# Patient Record
Sex: FEMALE | Race: WHITE | NOT HISPANIC OR LATINO | Employment: FULL TIME | URBAN - METROPOLITAN AREA
[De-identification: names, ages, dates, MRNs, and addresses within clinical notes are randomized per-mention and may not be internally consistent; named-entity substitution may affect disease eponyms.]

---

## 2020-03-09 ENCOUNTER — OFFICE VISIT (OUTPATIENT)
Dept: GASTROENTEROLOGY | Facility: CLINIC | Age: 60
End: 2020-03-09
Payer: COMMERCIAL

## 2020-03-09 VITALS
BODY MASS INDEX: 35.33 KG/M2 | DIASTOLIC BLOOD PRESSURE: 88 MMHG | WEIGHT: 192 LBS | HEART RATE: 70 BPM | HEIGHT: 62 IN | SYSTOLIC BLOOD PRESSURE: 124 MMHG

## 2020-03-09 DIAGNOSIS — K21.9 GASTROESOPHAGEAL REFLUX DISEASE, ESOPHAGITIS PRESENCE NOT SPECIFIED: ICD-10-CM

## 2020-03-09 DIAGNOSIS — Z86.010 PERSONAL HISTORY OF COLONIC POLYPS: ICD-10-CM

## 2020-03-09 DIAGNOSIS — R10.32 LLQ PAIN: Primary | ICD-10-CM

## 2020-03-09 DIAGNOSIS — K62.5 RECTAL BLEEDING: ICD-10-CM

## 2020-03-09 DIAGNOSIS — R19.8 RECTAL PRESSURE: ICD-10-CM

## 2020-03-09 DIAGNOSIS — R14.0 ABDOMINAL BLOATING: ICD-10-CM

## 2020-03-09 DIAGNOSIS — R14.3 EXCESSIVE FLATUS: ICD-10-CM

## 2020-03-09 DIAGNOSIS — Z83.71 FAMILY HISTORY OF COLONIC POLYPS: ICD-10-CM

## 2020-03-09 PROBLEM — Z86.0100 PERSONAL HISTORY OF COLONIC POLYPS: Status: ACTIVE | Noted: 2020-03-09

## 2020-03-09 PROBLEM — Z83.719 FAMILY HISTORY OF COLONIC POLYPS: Status: ACTIVE | Noted: 2020-03-09

## 2020-03-09 PROCEDURE — 99204 OFFICE O/P NEW MOD 45 MIN: CPT | Performed by: NURSE PRACTITIONER

## 2020-03-09 RX ORDER — TOPIRAMATE 25 MG/1
25-50 TABLET ORAL
COMMUNITY
Start: 2019-12-09

## 2020-03-09 RX ORDER — ESZOPICLONE 3 MG/1
3 TABLET, FILM COATED ORAL
COMMUNITY

## 2020-03-09 RX ORDER — ASCORBIC ACID 500 MG
500 TABLET ORAL DAILY
COMMUNITY

## 2020-03-09 RX ORDER — MELATONIN
1000 DAILY
COMMUNITY

## 2020-03-09 RX ORDER — LEVOTHYROXINE SODIUM 137 UG/1
137 CAPSULE ORAL DAILY
COMMUNITY
Start: 2020-02-24

## 2020-03-09 RX ORDER — MULTIVITAMIN
1 TABLET ORAL DAILY
COMMUNITY

## 2020-03-09 RX ORDER — OMEPRAZOLE 40 MG/1
40 CAPSULE, DELAYED RELEASE ORAL DAILY
COMMUNITY
Start: 2020-02-12

## 2020-03-09 RX ORDER — DICYCLOMINE HYDROCHLORIDE 10 MG/1
10 CAPSULE ORAL
Qty: 120 CAPSULE | Refills: 2 | Status: SHIPPED | OUTPATIENT
Start: 2020-03-09 | End: 2020-04-01

## 2020-03-09 NOTE — PATIENT INSTRUCTIONS
You can use the dicyclomine up to 4 times a day for the abdominal  Pain   get the blood work and the CT scan done    Once I have those results, we will most likely schedule you for colonoscopy

## 2020-03-09 NOTE — PROGRESS NOTES
[id number] - Outpatient Consultation  Monica Vale 64 y o  female MRN: [id number]  Encounter: [id number]    ASSESSMENT AND PLAN:      1  LLQ pain  2  Excessive flatus  3  Abdominal bloating  Reports symptoms are reminiscent of her 1st episode of diverticulitis 2 years ago however they have persisted for 1 month making acute diverticulitis less likely however she does have significant tenderness in the LLQ on exam   Additional differential considerations include infectious or ischemic colitis, colonic polyps or neoplasm, less likely IBD  -will check labs including CRP and ESR  -CT scan abdomen and pelvis and will treat if acute diverticulitis or infectious-type colitis is noted  -will try dicyclomine for the abdominal pain and may take up to 4 times a day  -pending CT results will schedule for colonoscopy as she is due on recall this year  - CBC; Future  - Comprehensive metabolic panel; Future  - CT abdomen pelvis w contrast; Future  - C-reactive protein; Future  - Sedimentation rate, automated; Future  - dicyclomine (BENTYL) 10 mg capsule; Take 1 capsule (10 mg total) by mouth 4 (four) times a day (before meals and at bedtime)  Dispense: 120 capsule; Refill: 2    4  Rectal bleeding  5  Rectal pressure  One-month of rectal pressure and 1 week of rectal bleeding with all bowel movements  Differential considerations include hemorrhoidal or diverticular but concern for colonic polyps or neoplasm as well     -will obtain some lab work and CT scan  -pending CT results, will schedule for colonoscopy  - CBC; Future  - Comprehensive metabolic panel; Future  - C-reactive protein; Future  - Sedimentation rate, automated; Future  - CT abdomen pelvis w contrast; Future    6  Gastroesophageal reflux disease, esophagitis presence not specified  Symptoms well controlled with omeprazole 40 mg daily    Last EGD 11/20/2017showed nonerosive gastritis and irregularity at, just proximal to the squamocolumnar junction  Pathology was negative for H pylori, Morales's  7  Personal history of colonic polyps  8  Family history of colonic polyps  High risk with PH of polyps, mother and 2 sisters with polyps  Last colonoscopy 12/04/2015 showed grade 2 internal hemorrhoids and 1 hyperplastic polyp  Five year recall-2020  Followup Appointment:  4 weeks  ______________________________________________________________________    Chief Complaint   Patient presents with    LLQ abdominal pain    Gas    Rectal Bleeding       HPI:   Bertin Gasca is a 64 y o   female who presents complaining of LLQ pain with additional GI symptoms that have persisted for 1 month  Reports her 1st event of diverticulitis was 2 years ago for which she was hospitalized and treated with antibiotics  Says surgery was recommended but she declined at that time  Since that event, she has had continuous LLQ pain that is a dull ache in unrelated to dietary indiscretions, meals  About 1 month ago, the pain intensified with additional complaint of abdominal bloating and excessive flatus with rectal pressure  Symptoms are unrelated to food triggers or meals  Continues with 1 soft bowel movement daily and will take a stool softener or eat oatmeal if she skips a day  The caliber of her bowel movements has decreased over the past month, now described as [place]    She says she feels like she has more frequent urge to defecate with a feeling of a lump in her rectal area  For the past 1 week, there has also been BRBPR with all bowel movements noted on the toilet paper and in the toilet water  Denies diarrhea or constipation, straining  Reports some fatigue but denies anorexia, early satiety, UGI or alarm symptoms  No fevers or chills  Appetite is normal   Weight is stable      Historical Information   Past Medical History:   Diagnosis Date    Anxiety     Colon polyp     Diverticulitis of colon     Diverticulosis     GERD (gastroesophageal reflux disease)     Hashimoto's disease     Hypothyroidism     Thyroid nodule     Vitamin D deficiency      Past Surgical History:   Procedure Laterality Date    ABDOMINOPLASTY       SECTION      X2    COLONOSCOPY      ELBOW SURGERY Left     Tendon repair    HYSTERECTOMY      With lysis of adhesions and abdominoplasty    KNEE CARTILAGE SURGERY Right     OOPHORECTOMY      One ovary removed with hysterectomy    SALPINGOSTOMY      Tubes fixed for fertility issues    UPPER GASTROINTESTINAL ENDOSCOPY       Social History     Substance and Sexual Activity   Alcohol Use Yes    Frequency: Monthly or less    Drinks per session: 3 or 4    Binge frequency: Never     Social History     Substance and Sexual Activity   Drug Use Never     Social History     Tobacco Use   Smoking Status Never Smoker   Smokeless Tobacco Never Used     Family History   Problem Relation Age of Onset    Colon polyps Mother     Diabetes Father     Colon polyps Sister     Colon cancer Paternal Uncle     Colon polyps Sister     No Known Problems Sister     No Known Problems Sister        Meds/Allergies     Current Outpatient Medications:     ascorbic acid (VITAMIN C) 500 mg tablet    cholecalciferol (VITAMIN D3) 1,000 units tablet    eszopiclone (LUNESTA) 3 MG tablet    levothyroxine 112 mcg tablet    Multiple Vitamin (MULTIVITAMIN) tablet    omeprazole (PriLOSEC) 40 MG capsule    topiramate (TOPAMAX) 25 mg tablet    dicyclomine (BENTYL) 10 mg capsule    No Known Allergies    PHYSICAL EXAM:    Blood pressure 124/88, pulse 70, height 5' 2" (1 575 m), weight 87 1 kg (192 lb)  Body mass index is 35 12 kg/m²  General Appearance: NAD, cooperative, alert  Head:  Normocephalic, atraumatic  Eyes: Anicteric, PERRLA, EOMI  ENT:  Normal external appearance, normal mucosa       Neck:  Supple, symmetrical, trachea midline,   Resp:  Clear to auscultation bilaterally; no rales, rhonchi or wheezing; respirations unlabored   CV:  S1 S2, Regular rate and rhythm; no murmur, rub, or gallop  GI:  Soft, LLQ tenderness with palpation but without rebound or guarding and otherwise abdomen was nontender, non-distended; normal bowel sounds; no masses, no organomegaly   Rectal:  Declines at this time  Musculoskeletal: No cyanosis, clubbing or edema  Normal ROM  Skin:  No jaundice, rashes, or lesions   Heme/Lymph: No palpable cervical lymphadenopathy  Psych: Normal affect, good eye contact  Neuro: No gross deficits, AAOx3    Lab Results:   No results found for: WBC, HGB, HCT, MCV, PLT  Lab Results   Component Value Date    BUN 11 09/25/2014    CREATININE 0 83 09/25/2014     No results found for: IRON, TIBC, FERRITIN  No results found for: LIPASE    Radiology Results:   No results found  REVIEW OF SYSTEMS:    CONSTITUTIONAL: Denies any fever, chills, rigors, and weight loss  Reports fatigue  HEENT: No earache or tinnitus  Denies hearing loss or visual disturbances  CARDIOVASCULAR: No chest pain or palpitations  RESPIRATORY: Denies any cough, hemoptysis, shortness of breath or dyspnea on exertion  GASTROINTESTINAL: As noted in the History of Present Illness  Reports blood in stool and change in bowel habits  GENITOURINARY: No problems with urination  Denies any hematuria or dysuria  NEUROLOGIC: No dizziness or vertigo, denies headaches  MUSCULOSKELETAL: Denies any muscle or joint pain  Hematology:  Reports easy bruising  SKIN: Denies skin rashes or itching  ENDOCRINE: Denies excessive thirst  Denies intolerance to heat or cold  PSYCHOSOCIAL: Denies depression or anxiety but reports difficulty sleeping  Denies any recent memory loss

## 2020-03-10 ENCOUNTER — TELEPHONE (OUTPATIENT)
Dept: GASTROENTEROLOGY | Facility: CLINIC | Age: 60
End: 2020-03-10

## 2020-03-10 NOTE — TELEPHONE ENCOUNTER
Pt states she needs CT A/P; called the hospital and they do not have the order  Order in [address] [employer] system  Pt states she will go to [facility]; asks for order to be fax'd to [phone]  Pt also has ques about lab order/has appt tomorrow ramiro Whiting'd lab order in system  Successfully fax'd CT order to [address] fax # [id number]

## 2020-03-12 LAB
CRP SERPL-MCNC: 4 MG/L (ref 0–10)
ERYTHROCYTE [SEDIMENTATION RATE] IN BLOOD BY WESTERGREN METHOD: 30 MM/HR (ref 0–40)

## 2020-03-17 ENCOUNTER — TELEPHONE (OUTPATIENT)
Dept: GASTROENTEROLOGY | Facility: CLINIC | Age: 60
End: 2020-03-17

## 2020-03-17 DIAGNOSIS — K57.92 DIVERTICULITIS: Primary | ICD-10-CM

## 2020-03-17 RX ORDER — METRONIDAZOLE 500 MG/1
500 TABLET ORAL 3 TIMES DAILY
Qty: 30 TABLET | Refills: 0 | Status: SHIPPED | OUTPATIENT
Start: 2020-03-17 | End: 2020-03-20

## 2020-03-17 RX ORDER — CIPROFLOXACIN 500 MG/1
500 TABLET, FILM COATED ORAL 2 TIMES DAILY
Qty: 20 TABLET | Refills: 0 | Status: SHIPPED | OUTPATIENT
Start: 2020-03-17 | End: 2020-03-20

## 2020-03-17 NOTE — TELEPHONE ENCOUNTER
Called and reviewed the CT scan results directly with the patient by telephone  She still having L LQ pain and the CT scan shows possible early sigmoid diverticulitis  I advised her that I will treat this with Cipro and metronidazole for 10 days  I did E scribed both of these to her pharmacy  I advised that she take these with food but keep the diet light, more clear liquids until the pain resolves  She should then increase her diet to low fiber      Thanks HPR

## 2020-03-18 ENCOUNTER — TELEPHONE (OUTPATIENT)
Dept: GASTROENTEROLOGY | Facility: CLINIC | Age: 60
End: 2020-03-18

## 2020-03-18 NOTE — TELEPHONE ENCOUNTER
Sent a message to [employer] requesting they delete the second page of her ct scan as is another pt report and replace it with her second page

## 2020-03-20 ENCOUNTER — TELEPHONE (OUTPATIENT)
Dept: GASTROENTEROLOGY | Facility: CLINIC | Age: 60
End: 2020-03-20

## 2020-03-20 DIAGNOSIS — K57.92 DIVERTICULITIS: Primary | ICD-10-CM

## 2020-03-20 RX ORDER — AMOXICILLIN AND CLAVULANATE POTASSIUM 875; 125 MG/1; MG/1
1 TABLET, FILM COATED ORAL EVERY 12 HOURS SCHEDULED
Qty: 20 TABLET | Refills: 0 | Status: SHIPPED | OUTPATIENT
Start: 2020-03-20 | End: 2020-03-30

## 2020-03-20 NOTE — TELEPHONE ENCOUNTER
Called and spoke with patient  She is still complaining of significant left lower abdominal pain  Denies any fevers, nausea, vomiting  She has been taking Cipro and Flagyl for the past 2 days with no improvement in her symptoms  She was treated for diverticulitis 2 years ago and had two medications which did not work for her (pt does not remember the names of the medications)  She has been trying to stick to a clear liquid diet, but had eggs this morning which may have worsened her pain  Discussed with Dr Tellez Res  Will switch to Augmentin 875 mg BID for 10 days, will stop Cipro and Flagyl  If she does not have any improvement of her symptoms in 24 hours, recommend that pt go to the ED for IV abx  Continue clear liquid diet and gradually advance to low fiber as tolerated  Pt verbalized understanding and all questions answered

## 2020-03-20 NOTE — TELEPHONE ENCOUNTER
Patient was seen by [facility] 3/9/2020  Ct A/P performed  Notes recorded by Jacob Costa on 3/18/2020 at 1:52 PM EDT  You have mild diverticulitis which we are already treating with an antibiotic per our discussion by phone yesterday  Per message below patient does not feel the antibiotics are working  I tried to find her previous therapy in eclinical; she was hospitalized at [facility] treated with IV then oral antibiotics  In 2/2018 she was prescribed Levaquin 750 mg once daily x 9 days along with metronidazole 500 mg every 8 hours x 9 days  Patient without LLQ pain improvement; she started course of oral antibiotics 3/18/2020  Please provide further recommendations   She denies fever, advised to take tylenol for pain, adhere to clear liquid diet for bowel rest

## 2020-03-20 NOTE — TELEPHONE ENCOUNTER
Pt was put on Flagyl and Cipro for a colon infection on Tuesday  Pt is getting no relief at this point  She is still having pain and diarrhea  She did have some eggs this am but is trying adhere to a clear liquid diet

## 2020-03-23 ENCOUNTER — TELEPHONE (OUTPATIENT)
Dept: GASTROENTEROLOGY | Facility: CLINIC | Age: 60
End: 2020-03-23

## 2020-03-23 NOTE — TELEPHONE ENCOUNTER
Called and spoke with patient  She states that her pain is about the same and rates pain 6/10  She did have some scrambled eggs this morning and states that the pain may be was a little bit worse  She has pain in left upper quadrant as well  She denies any fever, nausea, vomiting  She does have some looser stool since starting antibiotics but denies any rectal bleeding or melena  She is staying well hydrated  She started Augmentin on Friday evening  I recommended that she continue to monitor her symptoms closely for the next 24 to 48 hours  If her pain remains unchanged and she is not feeling any better, I would recommend ER evaluation at that time for IV antibiotics  Continue clear liquid diet as much as she is able to and slowly advance to low fiber diet  Patient verbalized understanding and all questions answered

## 2020-03-23 NOTE — TELEPHONE ENCOUNTER
I called the patient  Symptoms a little better but still only clear liquids and she has to continue working ( got laid off) and she really does not want to go to hospital   Offered virtual visit and/or OV but is concerned about insurance coverage  Could you please touch base with the patient today?

## 2020-03-23 NOTE — TELEPHONE ENCOUNTER
----- Message from Greer Palmer sent at 3/22/2020 10:35 AM EDT -----  Regarding: Visit Follow-Up Question  Contact: [phone]  Hi Dr Iveth Nogueira, the nurse changed my antibiotic on Friday but I am still feeling pain on my left side  I do not have a fever and have been on clear liquids since Tuesday night  I am feeling pretty weak and trying to manage my job with not eating  It is not as bad as when I went in the hospital 2 years ago but I am scared to have this going on with the Virus going around and don't want my pain to escalate  Please advise

## 2020-04-01 DIAGNOSIS — R10.32 LLQ PAIN: ICD-10-CM

## 2020-04-01 RX ORDER — DICYCLOMINE HYDROCHLORIDE 10 MG/1
10 CAPSULE ORAL
Qty: 360 CAPSULE | Refills: 2 | Status: SHIPPED | OUTPATIENT
Start: 2020-04-01

## 2020-04-09 ENCOUNTER — TELEPHONE (OUTPATIENT)
Dept: GASTROENTEROLOGY | Facility: CLINIC | Age: 60
End: 2020-04-09

## 2020-04-13 ENCOUNTER — TELEMEDICINE (OUTPATIENT)
Dept: GASTROENTEROLOGY | Facility: CLINIC | Age: 60
End: 2020-04-13
Payer: COMMERCIAL

## 2020-04-13 VITALS — HEIGHT: 62 IN | BODY MASS INDEX: 34.41 KG/M2 | WEIGHT: 187 LBS

## 2020-04-13 DIAGNOSIS — K62.5 RECTAL BLEEDING: ICD-10-CM

## 2020-04-13 DIAGNOSIS — Z83.71 FAMILY HISTORY OF COLONIC POLYPS: ICD-10-CM

## 2020-04-13 DIAGNOSIS — Z86.010 PERSONAL HISTORY OF COLONIC POLYPS: ICD-10-CM

## 2020-04-13 DIAGNOSIS — R19.7 DIARRHEA OF PRESUMED INFECTIOUS ORIGIN: Primary | ICD-10-CM

## 2020-04-13 DIAGNOSIS — R10.32 LLQ PAIN: ICD-10-CM

## 2020-04-13 DIAGNOSIS — Z12.11 ENCOUNTER FOR SCREENING COLONOSCOPY: ICD-10-CM

## 2020-04-13 PROCEDURE — 99214 OFFICE O/P EST MOD 30 MIN: CPT | Performed by: INTERNAL MEDICINE

## 2020-04-13 RX ORDER — VANCOMYCIN HYDROCHLORIDE 250 MG/1
250 CAPSULE ORAL 4 TIMES DAILY
Qty: 56 CAPSULE | Refills: 0 | Status: SHIPPED | OUTPATIENT
Start: 2020-04-13 | End: 2020-04-27

## 2020-04-15 ENCOUNTER — TELEPHONE (OUTPATIENT)
Dept: GASTROENTEROLOGY | Facility: CLINIC | Age: 60
End: 2020-04-15

## 2020-04-16 ENCOUNTER — TELEPHONE (OUTPATIENT)
Dept: GASTROENTEROLOGY | Facility: CLINIC | Age: 60
End: 2020-04-16

## 2020-04-17 ENCOUNTER — TELEPHONE (OUTPATIENT)
Dept: GASTROENTEROLOGY | Facility: CLINIC | Age: 60
End: 2020-04-17

## 2020-06-22 NOTE — TELEPHONE ENCOUNTER
The correct ct scan first and second page is in pt's chart under ordered 4/13/20 but the ordered 3/9/20 still needs to be deleted  Butch Mello, could you please assist me with this deletion

## 2020-06-23 NOTE — TELEPHONE ENCOUNTER
Submitted a third chart correction request and again requested update once correction is made  We'll keep an eye out for it, if not resolved will contact Chritsiano Razo

## 2020-10-16 ENCOUNTER — TELEPHONE (OUTPATIENT)
Dept: GASTROENTEROLOGY | Facility: CLINIC | Age: 60
End: 2020-10-16

## 2020-11-23 ENCOUNTER — TELEMEDICINE (OUTPATIENT)
Dept: GASTROENTEROLOGY | Facility: CLINIC | Age: 60
End: 2020-11-23

## 2020-11-23 VITALS — BODY MASS INDEX: 34.96 KG/M2 | HEIGHT: 62 IN | WEIGHT: 190 LBS

## 2020-11-23 DIAGNOSIS — Z83.71 FAMILY HISTORY OF COLONIC POLYPS: ICD-10-CM

## 2020-11-23 DIAGNOSIS — Z86.010 PERSONAL HISTORY OF COLONIC POLYPS: Primary | ICD-10-CM

## 2020-11-23 RX ORDER — CYCLOBENZAPRINE HCL 10 MG
10 TABLET ORAL 3 TIMES DAILY PRN
COMMUNITY

## 2020-11-23 RX ORDER — SODIUM, POTASSIUM,MAG SULFATES 17.5-3.13G
SOLUTION, RECONSTITUTED, ORAL ORAL
Qty: 2 BOTTLE | Refills: 0 | Status: SHIPPED | OUTPATIENT
Start: 2020-11-23 | End: 2020-11-30 | Stop reason: HOSPADM

## 2020-11-30 ENCOUNTER — ANESTHESIA EVENT (OUTPATIENT)
Dept: GASTROENTEROLOGY | Facility: AMBULATORY SURGERY CENTER | Age: 60
End: 2020-11-30

## 2020-11-30 ENCOUNTER — ANESTHESIA (OUTPATIENT)
Dept: GASTROENTEROLOGY | Facility: AMBULATORY SURGERY CENTER | Age: 60
End: 2020-11-30

## 2020-11-30 ENCOUNTER — HOSPITAL ENCOUNTER (OUTPATIENT)
Dept: GASTROENTEROLOGY | Facility: AMBULATORY SURGERY CENTER | Age: 60
Discharge: HOME | End: 2020-11-30
Payer: COMMERCIAL

## 2020-11-30 VITALS
DIASTOLIC BLOOD PRESSURE: 68 MMHG | HEART RATE: 72 BPM | TEMPERATURE: 98.5 F | SYSTOLIC BLOOD PRESSURE: 105 MMHG | OXYGEN SATURATION: 97 % | RESPIRATION RATE: 18 BRPM

## 2020-11-30 VITALS — HEART RATE: 90 BPM

## 2020-11-30 DIAGNOSIS — Z86.010 PERSONAL HISTORY OF COLONIC POLYPS: ICD-10-CM

## 2020-11-30 DIAGNOSIS — K22.70 BARRETT'S ESOPHAGUS WITHOUT DYSPLASIA: ICD-10-CM

## 2020-11-30 DIAGNOSIS — Z12.11 SCREENING FOR COLON CANCER: ICD-10-CM

## 2020-11-30 PROCEDURE — 88313 SPECIAL STAINS GROUP 2: CPT | Performed by: PATHOLOGY

## 2020-11-30 PROCEDURE — 43239 EGD BIOPSY SINGLE/MULTIPLE: CPT | Performed by: INTERNAL MEDICINE

## 2020-11-30 PROCEDURE — 88305 TISSUE EXAM BY PATHOLOGIST: CPT | Performed by: PATHOLOGY

## 2020-11-30 PROCEDURE — G0105 COLORECTAL SCRN; HI RISK IND: HCPCS | Performed by: INTERNAL MEDICINE

## 2020-11-30 RX ORDER — PROPOFOL 10 MG/ML
INJECTION, EMULSION INTRAVENOUS AS NEEDED
Status: DISCONTINUED | OUTPATIENT
Start: 2020-11-30 | End: 2020-11-30

## 2020-11-30 RX ORDER — SODIUM CHLORIDE 9 MG/ML
50 INJECTION, SOLUTION INTRAVENOUS CONTINUOUS
Status: DISCONTINUED | OUTPATIENT
Start: 2020-11-30 | End: 2020-11-30

## 2020-11-30 RX ADMIN — SODIUM CHLORIDE 50 ML/HR: 9 INJECTION, SOLUTION INTRAVENOUS at 08:17

## 2020-11-30 RX ADMIN — PROPOFOL 100 MG: 10 INJECTION, EMULSION INTRAVENOUS at 08:38

## 2020-11-30 RX ADMIN — PROPOFOL 50 MG: 10 INJECTION, EMULSION INTRAVENOUS at 08:31

## 2020-11-30 RX ADMIN — PROPOFOL 150 MG: 10 INJECTION, EMULSION INTRAVENOUS at 08:28

## 2020-11-30 RX ADMIN — PROPOFOL 50 MG: 10 INJECTION, EMULSION INTRAVENOUS at 08:44

## 2021-04-06 DIAGNOSIS — Z23 ENCOUNTER FOR IMMUNIZATION: ICD-10-CM

## 2021-04-15 ENCOUNTER — IMMUNIZATIONS (OUTPATIENT)
Dept: FAMILY MEDICINE CLINIC | Facility: HOSPITAL | Age: 61
End: 2021-04-15

## 2021-04-15 DIAGNOSIS — Z23 ENCOUNTER FOR IMMUNIZATION: Primary | ICD-10-CM

## 2021-04-15 PROCEDURE — 91300 SARS-COV-2 / COVID-19 MRNA VACCINE (PFIZER-BIONTECH) 30 MCG: CPT

## 2021-04-15 PROCEDURE — 0001A SARS-COV-2 / COVID-19 MRNA VACCINE (PFIZER-BIONTECH) 30 MCG: CPT

## 2021-05-10 ENCOUNTER — IMMUNIZATIONS (OUTPATIENT)
Dept: FAMILY MEDICINE CLINIC | Facility: HOSPITAL | Age: 61
End: 2021-05-10

## 2021-05-10 DIAGNOSIS — Z23 ENCOUNTER FOR IMMUNIZATION: Primary | ICD-10-CM

## 2021-05-10 PROCEDURE — 91300 SARS-COV-2 / COVID-19 MRNA VACCINE (PFIZER-BIONTECH) 30 MCG: CPT

## 2021-05-10 PROCEDURE — 0002A SARS-COV-2 / COVID-19 MRNA VACCINE (PFIZER-BIONTECH) 30 MCG: CPT

## 2023-01-30 LAB — HBA1C MFR BLD HPLC: 5.4 %

## 2023-07-10 ENCOUNTER — TELEPHONE (OUTPATIENT)
Age: 63
End: 2023-07-10

## 2023-07-10 NOTE — TELEPHONE ENCOUNTER
Pt call transferred to nurses line. Pt explained she was in ED for diverticulitis and that the pain is gone now. However pt experiencing loss of appetite, diarrhea, and lethargy. I provided education on symptoms that would prompt ED evaluation. I explained to pt given she is a new pt, I am unable to further advise and an OV would be best. Pt is scheduled for 7/21.

## 2023-07-21 ENCOUNTER — OFFICE VISIT (OUTPATIENT)
Age: 63
End: 2023-07-21
Payer: COMMERCIAL

## 2023-07-21 VITALS
DIASTOLIC BLOOD PRESSURE: 80 MMHG | WEIGHT: 152.2 LBS | HEIGHT: 62 IN | SYSTOLIC BLOOD PRESSURE: 110 MMHG | BODY MASS INDEX: 28.01 KG/M2

## 2023-07-21 DIAGNOSIS — K57.92 ACUTE DIVERTICULITIS: Primary | ICD-10-CM

## 2023-07-21 DIAGNOSIS — K21.9 GASTROESOPHAGEAL REFLUX DISEASE WITHOUT ESOPHAGITIS: ICD-10-CM

## 2023-07-21 DIAGNOSIS — K22.70 BARRETT'S ESOPHAGUS WITHOUT DYSPLASIA: ICD-10-CM

## 2023-07-21 PROCEDURE — 99213 OFFICE O/P EST LOW 20 MIN: CPT | Performed by: INTERNAL MEDICINE

## 2023-07-21 NOTE — PROGRESS NOTES
[facility] - Outpatient Follow-up Note  Lindy Child 63 y.o. female MRN: [id number]  Encounter: [id number]    ASSESSMENT AND PLAN:      1. Acute diverticulitis  Admitted to [address] [place] [address] in late June with acute uncomplicated diverticulitis of the distal descending colon after failing outpatient Augmentin. this was her fourth bout over the last 5 years    She was discharged on Levaquin/Flagyl and symptoms improved. Has a consult with Dr. Kaleigh Diana of colorectal surgery next week to discuss elective sigmoid resection. Her last colonoscopy was November 2020 and showed sigmoid diverticulosis, will confer with Dr. Kaleigh Diana whether repeat colonoscopy is necessary preop. 2. Gastroesophageal reflux disease without esophagitis  3. Morales's esophagus without dysplasia  Morales's without dysplasia most recent EGD November 2020, due for recall in November. Continue antireflux diet and omeprazole    4. Personal history of colon polyps  No polyps on most recent colonoscopy November 2020, 5-year recall planned    Followup Appointment: EGD November  ______________________________________________________________________    Chief Complaint   Patient presents with   • Hospital Follow-up     Pt was at [facility] for diverticulitis June 2023. Pt has diarrhea, PCP recommended Imodium, seems to be working. Also using Metamucil 2x daily. Pt still has some abdominal pain. HPI: The patient presents for follow-up on acute diverticulitis. She was admitted to [address] [place] [address] in late June. She had failed outpatient Augmentin. Symptoms gradually improved with IV antibiotics and she was discharged on Levaquin/Flagyl. She had some moderate discomfort for a few weeks after discharge but this has now resolved and she is back to baseline. She used IBgard transiently for abdominal bloating. This induced some reflux complaints.   She has an appointment with colorectal surgery next week to discuss elective sigmoidectomy    Historical Information   Past Medical History:   Diagnosis Date   • Anxiety    • Colon polyp    • Disease of thyroid gland     hypo   • Diverticulitis of colon    • Diverticulosis    • GERD (gastroesophageal reflux disease)    • Hashimoto's disease    • Hypothyroidism    • Thyroid nodule    • Vitamin D deficiency      Past Surgical History:   Procedure Laterality Date   • ABDOMINOPLASTY     •  SECTION      X2   • COLONOSCOPY  2015   • ELBOW SURGERY Left     Tendon repair   • HYSTERECTOMY      With lysis of adhesions and abdominoplasty   • KNEE CARTILAGE SURGERY Right    • OOPHORECTOMY      One ovary removed with hysterectomy   • SALPINGOSTOMY      Tubes fixed for fertility issues   • UPPER GASTROINTESTINAL ENDOSCOPY  2017     Social History     Substance and Sexual Activity   Alcohol Use Yes     Social History     Substance and Sexual Activity   Drug Use Never     Social History     Tobacco Use   Smoking Status Never   Smokeless Tobacco Never     Family History   Problem Relation Age of Onset   • Colon polyps Mother    • Diabetes Father    • Colon polyps Sister    • Colon cancer Paternal Uncle    • Colon polyps Sister    • No Known Problems Sister    • No Known Problems Sister          Current Outpatient Medications:   •  ascorbic acid (VITAMIN C) 500 mg tablet  •  cyclobenzaprine (FLEXERIL) 10 mg tablet  •  eszopiclone (LUNESTA) 3 MG tablet  •  Levothyroxine Sodium 137 MCG CAPS  •  Multiple Vitamin (MULTIVITAMIN) tablet  •  omeprazole (PriLOSEC) 40 MG capsule  •  cholecalciferol (VITAMIN D3) 1,000 units tablet  •  dicyclomine (BENTYL) 10 mg capsule  •  topiramate (TOPAMAX) 25 mg tablet  Allergies   Allergen Reactions   • Latex Hives, Itching and Rash     Reviewed medications and allergies and updated as indicated    PHYSICAL EXAM:    Blood pressure 110/80, height 5' 2.25" (1.581 m), weight 69 kg (152 lb 3.2 oz). Body mass index is 27.61 kg/m².   General Appearance: NAD, cooperative, alert  Eyes: Anicteric, PERRLA, EOMI  ENT:  Normocephalic, atraumatic, normal mucosa. Neck:  Supple, symmetrical, trachea midline  Resp:  Clear to auscultation bilaterally; no rales, rhonchi or wheezing; respirations unlabored   CV:  S1 S2, Regular rate and rhythm; no murmur, rub, or gallop. GI:  Soft, non-tender, non-distended; normal bowel sounds; no masses, no organomegaly   Rectal: Deferred  Musculoskeletal: No cyanosis, clubbing or edema. Normal ROM. Skin:  No jaundice, rashes, or lesions   Heme/Lymph: No palpable cervical lymphadenopathy  Psych: Normal affect, good eye contact  Neuro: No gross deficits, AAOx3    Lab Results:   No results found for: "WBC", "HGB", "HCT", "MCV", "PLT"  Lab Results   Component Value Date    BUN 11 09/25/2014    CREATININE 0.83 09/25/2014     No results found for: "IRON", "TIBC", "FERRITIN"  No results found for: "LIPASE"    Radiology Results:   No results found.

## 2023-07-28 ENCOUNTER — NURSE TRIAGE (OUTPATIENT)
Age: 63
End: 2023-07-28

## 2023-07-28 NOTE — TELEPHONE ENCOUNTER
Regarding: surg request  ----- Message from Carlos Enrique Peralta MA sent at 7/28/2023 10:08 AM EDT -----  Pt called stating that dr. Lam Mendiola wants her to have a colonoscopy prior to pt having surg. Please advise.  Pt not due until 2025

## 2023-07-31 NOTE — TELEPHONE ENCOUNTER
Per Dr Catarina Lawler pt asap for colon [facility] endo with him  Dr Tani Cannon has opening 8/14 or 8/17  NEEDS ASD DONE  HAD OV SO OA DOES NOT NEED TO BE DONE

## 2023-08-11 ENCOUNTER — TELEPHONE (OUTPATIENT)
Dept: GASTROENTEROLOGY | Facility: CLINIC | Age: 63
End: 2023-08-11

## 2023-08-11 NOTE — TELEPHONE ENCOUNTER
Procedure confirmed  Colonoscopy     Via: [employer]    Instructions given: Mail     Prep Given: Miralax/Dulcolax    Call the office if there are any questions.

## 2023-08-24 ENCOUNTER — ANESTHESIA EVENT (OUTPATIENT)
Dept: GASTROENTEROLOGY | Facility: AMBULATORY SURGERY CENTER | Age: 63
End: 2023-08-24

## 2023-08-24 ENCOUNTER — ANESTHESIA (OUTPATIENT)
Dept: GASTROENTEROLOGY | Facility: AMBULATORY SURGERY CENTER | Age: 63
End: 2023-08-24

## 2023-08-24 ENCOUNTER — HOSPITAL ENCOUNTER (OUTPATIENT)
Dept: GASTROENTEROLOGY | Facility: AMBULATORY SURGERY CENTER | Age: 63
Discharge: HOME | End: 2023-08-24
Payer: COMMERCIAL

## 2023-08-24 VITALS
SYSTOLIC BLOOD PRESSURE: 104 MMHG | BODY MASS INDEX: 27.23 KG/M2 | WEIGHT: 148 LBS | DIASTOLIC BLOOD PRESSURE: 56 MMHG | TEMPERATURE: 97.9 F | OXYGEN SATURATION: 99 % | RESPIRATION RATE: 18 BRPM | HEIGHT: 62 IN | HEART RATE: 71 BPM

## 2023-08-24 DIAGNOSIS — K57.92 ACUTE DIVERTICULITIS: ICD-10-CM

## 2023-08-24 PROCEDURE — 45378 DIAGNOSTIC COLONOSCOPY: CPT | Performed by: INTERNAL MEDICINE

## 2023-08-24 RX ORDER — SODIUM CHLORIDE, SODIUM LACTATE, POTASSIUM CHLORIDE, CALCIUM CHLORIDE 600; 310; 30; 20 MG/100ML; MG/100ML; MG/100ML; MG/100ML
50 INJECTION, SOLUTION INTRAVENOUS CONTINUOUS
Status: DISCONTINUED | OUTPATIENT
Start: 2023-08-24 | End: 2023-08-24

## 2023-08-24 RX ORDER — PROPOFOL 10 MG/ML
INJECTION, EMULSION INTRAVENOUS AS NEEDED
Status: DISCONTINUED | OUTPATIENT
Start: 2023-08-24 | End: 2023-08-24

## 2023-08-24 RX ADMIN — PROPOFOL 150 MG: 10 INJECTION, EMULSION INTRAVENOUS at 09:48

## 2023-08-24 RX ADMIN — PROPOFOL 50 MG: 10 INJECTION, EMULSION INTRAVENOUS at 10:02

## 2023-08-24 RX ADMIN — PROPOFOL 50 MG: 10 INJECTION, EMULSION INTRAVENOUS at 09:52

## 2023-08-24 RX ADMIN — SODIUM CHLORIDE, SODIUM LACTATE, POTASSIUM CHLORIDE, CALCIUM CHLORIDE 50 ML/HR: 600; 310; 30; 20 INJECTION, SOLUTION INTRAVENOUS at 09:27

## 2023-08-24 RX ADMIN — PROPOFOL 50 MG: 10 INJECTION, EMULSION INTRAVENOUS at 09:58

## 2023-08-24 NOTE — ANESTHESIA PREPROCEDURE EVALUATION
Procedure:  COLONOSCOPY    Relevant Problems   GI/HEPATIC   (+) Gastroesophageal reflux disease   (+) Rectal bleeding      NEURO/PSYCH   (+) Anxiety      Endocrine   (+) Hashimoto's disease        Physical Exam    Airway    Mallampati score: II  TM Distance: <3 FB  Neck ROM: full     Dental   Comment: None loose, No notable dental hx     Cardiovascular      Pulmonary      Other Findings        Anesthesia Plan  ASA Score- 2     Anesthesia Type- IV sedation with anesthesia with ASA Monitors. Additional Monitors:   Airway Plan:     Comment: 05:30 - last of PO bowel prep    Patient educated on the possibility for awareness under sedation and of the possibility of airway intervention in the event of an airway or procedural emergency  . Plan Factors-Exercise tolerance (METS): >4 METS. Chart reviewed. Patient summary reviewed. Patient is not a current smoker. Induction- intravenous. Postoperative Plan-     Informed Consent- Anesthetic plan and risks discussed with patient. I personally reviewed this patient with the CRNA. Discussed and agreed on the Anesthesia Plan with the CRNA. Tom Nation

## 2023-08-24 NOTE — ANESTHESIA POSTPROCEDURE EVALUATION
Post-Op Assessment Note    CV Status:  Stable  Pain Score: 0    Pain management: adequate     Mental Status:  Sleepy   Hydration Status:  Euvolemic and stable   PONV Controlled:  None   Airway Patency:  Patent      Post Op Vitals Reviewed: Yes      Staff: CRNA         No notable events documented.     BP (!) 86/50 (08/24/23 1004)    Temp      Pulse 67 (08/24/23 1004)   Resp 19 (08/24/23 1004)    SpO2 98 % (08/24/23 1004)

## 2023-11-08 ENCOUNTER — PREP FOR PROCEDURE (OUTPATIENT)
Dept: GASTROENTEROLOGY | Facility: CLINIC | Age: 63
End: 2023-11-08

## 2023-11-08 ENCOUNTER — TELEPHONE (OUTPATIENT)
Age: 63
End: 2023-11-08

## 2023-11-08 DIAGNOSIS — K22.70 BARRETT'S ESOPHAGUS WITHOUT DYSPLASIA: Primary | ICD-10-CM

## 2023-11-08 NOTE — TELEPHONE ENCOUNTER
11/08/23  Screened by: Marion Schmid    Referring Provider     Pre- Screening: There is no height or weight on file to calculate BMI. Has patient been referred for a routine screening Colonoscopy? no  Is the patient between 43-73 years old? yes      Previous Colonoscopy yes   If yes:    Date: 11/30*/2020    Facility: [facility])    Reason:       SCHEDULING STAFF: If the patient is between 45yrs-49yrs, please advise patient to confirm benefits/coverage with their insurance company for a routine screening colonoscopy, some insurance carriers will only cover at [address] [place] [address] or older. If the patient is over 66years old, please schedule an office visit. Does the patient want to see a Gastroenterologist prior to their procedure OR are they having any GI symptoms? no    Has the patient been hospitalized or had abdominal surgery in the past 6 months? yes  Colon Resection 10/03/2023     -Per Dr Shira banks to schedule procedure 8 weeks from procedure date    Does the patient use supplemental oxygen? no    Does the patient take Coumadin, Lovenox, Plavix, Elliquis, Xarelto, or other blood thinning medication? no    Has the patient had a stroke, cardiac event, or stent placed in the past year? no    SCHEDULING STAFF: If patient answers NO to above questions, then schedule procedure. If patient answers YES to above questions, then schedule office appointment. If patient is between 45yrs - 49yrs, please advise patient that we will have to confirm benefits & coverage with their insurance company for a routine screening colonoscopy.

## 2023-11-08 NOTE — TELEPHONE ENCOUNTER
Patients GI provider:  Dr. Sophia Peters     Number to return call: [phone]      Reason for call: Pt calling in to [facility] 1 yr recall EGD please contact pt to [facility]      Scheduled procedure/appointment date if applicable: Apt/procedure

## 2023-11-08 NOTE — TELEPHONE ENCOUNTER
Scheduled date of EGD(as of today):12/27/2023  [facility]  Location of [facility]  Instructions emailed to patient by: ekaterina  Clearances: none

## 2023-12-13 ENCOUNTER — ANESTHESIA EVENT (OUTPATIENT)
Dept: ANESTHESIOLOGY | Facility: AMBULATORY SURGERY CENTER | Age: 63
End: 2023-12-13

## 2023-12-13 ENCOUNTER — ANESTHESIA (OUTPATIENT)
Dept: ANESTHESIOLOGY | Facility: AMBULATORY SURGERY CENTER | Age: 63
End: 2023-12-13

## 2023-12-27 ENCOUNTER — ANESTHESIA (OUTPATIENT)
Dept: GASTROENTEROLOGY | Facility: AMBULATORY SURGERY CENTER | Age: 63
End: 2023-12-27

## 2023-12-27 ENCOUNTER — ANESTHESIA EVENT (OUTPATIENT)
Dept: GASTROENTEROLOGY | Facility: AMBULATORY SURGERY CENTER | Age: 63
End: 2023-12-27

## 2023-12-27 ENCOUNTER — HOSPITAL ENCOUNTER (OUTPATIENT)
Dept: GASTROENTEROLOGY | Facility: AMBULATORY SURGERY CENTER | Age: 63
Discharge: HOME | End: 2023-12-27
Payer: COMMERCIAL

## 2023-12-27 VITALS
SYSTOLIC BLOOD PRESSURE: 115 MMHG | WEIGHT: 148 LBS | RESPIRATION RATE: 21 BRPM | BODY MASS INDEX: 27.23 KG/M2 | DIASTOLIC BLOOD PRESSURE: 70 MMHG | HEIGHT: 62 IN | OXYGEN SATURATION: 100 % | HEART RATE: 52 BPM | TEMPERATURE: 98.4 F

## 2023-12-27 DIAGNOSIS — K22.70 BARRETT'S ESOPHAGUS WITHOUT DYSPLASIA: ICD-10-CM

## 2023-12-27 PROBLEM — G47.30 SLEEP APNEA: Status: ACTIVE | Noted: 2023-12-27

## 2023-12-27 PROCEDURE — 43239 EGD BIOPSY SINGLE/MULTIPLE: CPT | Performed by: INTERNAL MEDICINE

## 2023-12-27 PROCEDURE — 88305 TISSUE EXAM BY PATHOLOGIST: CPT | Performed by: PATHOLOGY

## 2023-12-27 RX ORDER — PROPOFOL 10 MG/ML
INJECTION, EMULSION INTRAVENOUS AS NEEDED
Status: DISCONTINUED | OUTPATIENT
Start: 2023-12-27 | End: 2023-12-27

## 2023-12-27 RX ORDER — FLUCONAZOLE 150 MG/1
150 TABLET ORAL ONCE
COMMUNITY

## 2023-12-27 RX ORDER — SODIUM CHLORIDE, SODIUM LACTATE, POTASSIUM CHLORIDE, CALCIUM CHLORIDE 600; 310; 30; 20 MG/100ML; MG/100ML; MG/100ML; MG/100ML
75 INJECTION, SOLUTION INTRAVENOUS CONTINUOUS
Status: DISCONTINUED | OUTPATIENT
Start: 2023-12-27 | End: 2023-12-31 | Stop reason: HOSPADM

## 2023-12-27 RX ADMIN — SODIUM CHLORIDE, SODIUM LACTATE, POTASSIUM CHLORIDE, CALCIUM CHLORIDE 75 ML/HR: 600; 310; 30; 20 INJECTION, SOLUTION INTRAVENOUS at 10:52

## 2023-12-27 RX ADMIN — SODIUM CHLORIDE, SODIUM LACTATE, POTASSIUM CHLORIDE, CALCIUM CHLORIDE: 600; 310; 30; 20 INJECTION, SOLUTION INTRAVENOUS at 11:19

## 2023-12-27 RX ADMIN — PROPOFOL 100 MG: 10 INJECTION, EMULSION INTRAVENOUS at 11:13

## 2023-12-27 RX ADMIN — PROPOFOL 50 MG: 10 INJECTION, EMULSION INTRAVENOUS at 11:17

## 2023-12-27 NOTE — ANESTHESIA POSTPROCEDURE EVALUATION
Post-Op Assessment Note    CV Status:  Stable  Pain Score: 0    Pain management: adequate       Mental Status:  Alert and awake   Hydration Status:  Euvolemic   PONV Controlled:  Controlled   Airway Patency:  Patent     Post Op Vitals Reviewed: Yes      Staff: CRNA               BP   109/66   Temp   98   Pulse  81   Resp   16   SpO2   98

## 2023-12-27 NOTE — H&P
History and Physical - [facility]  Gabriela Edwards 63 y.o. female MRN: [id number]    HPI: Gabriela Edwards is a 63 y.o. year old female who presents for Morales's esophagus    REVIEW OF SYSTEMS: Per the HPI, and otherwise unremarkable.    Historical Information   Past Medical History:   Diagnosis Date    Anxiety     Colon polyp     Disease of thyroid gland     hypo    Diverticulitis of colon     Diverticulosis     GERD (gastroesophageal reflux disease)     Hashimoto's disease     Hypothyroidism     Thrush, oral     Thyroid nodule     Vitamin D deficiency      Past Surgical History:   Procedure Laterality Date    ABDOMINOPLASTY       SECTION      X2    COLON SURGERY  10/03/2023    colon resection    COLONOSCOPY  2015    ELBOW SURGERY Left     Tendon repair    HYSTERECTOMY      With lysis of adhesions and abdominoplasty    KNEE CARTILAGE SURGERY Right     OOPHORECTOMY      One ovary removed with hysterectomy    SALPINGOSTOMY      Tubes fixed for fertility issues    SINUS SURGERY N/A     UPPER GASTROINTESTINAL ENDOSCOPY  2017     Social History   Social History     Substance and Sexual Activity   Alcohol Use Yes    Comment: rarely     Social History     Substance and Sexual Activity   Drug Use Never     Social History     Tobacco Use   Smoking Status Never   Smokeless Tobacco Never     Family History   Problem Relation Age of Onset    Colon polyps Mother     Diabetes Father     Colon polyps Sister     Colon cancer Paternal Uncle     Colon polyps Sister     No Known Problems Sister     No Known Problems Sister        Meds/Allergies       Current Outpatient Medications:     ascorbic acid (VITAMIN C) 500 mg tablet    cholecalciferol (VITAMIN D3) 1,000 units tablet    eszopiclone (LUNESTA) 3 MG tablet    fluconazole (DIFLUCAN) 150 mg tablet    Levothyroxine Sodium 137 MCG CAPS    Multiple Vitamin (MULTIVITAMIN) tablet    omeprazole (PriLOSEC) 40 MG capsule    cyclobenzaprine  "(FLEXERIL) 10 mg tablet    Current Facility-Administered Medications:     lactated ringers infusion, 75 mL/hr, Intravenous, Continuous, 75 mL/hr at 12/27/23 1052    Allergies   Allergen Reactions    Latex Hives, Itching and Rash       Objective     /66   Pulse 56   Temp 98.4 °F (36.9 °C) (Temporal)   Resp 18   Ht 5' 2\" (1.575 m)   Wt 67.1 kg (148 lb)   SpO2 99%   BMI 27.07 kg/m²     PHYSICAL EXAM    Gen: NAD AAOx3  Head: Normocephalic, Atraumatic  CV: S1S2 RRR no m/r/g  CHEST: Clear b/l no c/r/w  ABD: soft, +BS NT/ND  EXT: no edema    ASSESSMENT/PLAN:  This is a 63 y.o. year old female here for EGD, and she is stable and optimized for her procedure.        "

## 2023-12-27 NOTE — ANESTHESIA PREPROCEDURE EVALUATION
Procedure:  EGD    Relevant Problems   ANESTHESIA (within normal limits)      CARDIO (within normal limits)      GI/HEPATIC  Pt reports multiple episodes oral thrush since the summer  S/p colon resection for diverticulitis since her colonoscopy this summer   (+) Gastroesophageal reflux disease      NEURO/PSYCH   (+) Anxiety      PULMONARY  Sinus infection 2 weeks ago, no cough/lower resp symptoms.  Resolved   (+) Sleep apnea (Pt reports hx mild SARAH, was unable to tolerate CPAP)   (-) Smoking      Endocrine   (+) Hashimoto's disease      Physical Exam    Airway    Mallampati score: II  TM Distance: >3 FB  Neck ROM: full     Dental   No notable dental hx     Cardiovascular      Pulmonary      Other Findings  post-pubertal.    Lab Results   Component Value Date    HGBA1C 5.4 01/30/2023     Anesthesia Plan  ASA Score- 2     Anesthesia Type- IV sedation with anesthesia with ASA Monitors.         Additional Monitors:     Airway Plan:            Plan Factors-Exercise tolerance (METS): >4 METS.    Chart reviewed.   Existing labs reviewed. Patient summary reviewed.    Patient is not a current smoker.              Induction- intravenous.    Postoperative Plan-     Informed Consent- Anesthetic plan and risks discussed with patient.  I personally reviewed this patient with the CRNA. Discussed and agreed on the Anesthesia Plan with the CRNA..

## 2023-12-29 PROCEDURE — 88305 TISSUE EXAM BY PATHOLOGIST: CPT | Performed by: PATHOLOGY

## 2024-01-05 ENCOUNTER — TELEPHONE (OUTPATIENT)
Age: 64
End: 2024-01-05

## 2024-01-05 NOTE — TELEPHONE ENCOUNTER
Patients GI provider:  Dr. Escobar    Number to return call: [phone]    Reason for call:  from [employer] was calling to see if the prior auth for the [place]retts brushing swab has been started? Please call  back at the above number

## 2024-01-08 ENCOUNTER — NURSE TRIAGE (OUTPATIENT)
Age: 64
End: 2024-01-08

## 2024-01-08 NOTE — TELEPHONE ENCOUNTER
"Reason for Disposition   Information only question and nurse able to answer    Answer Assessment - Initial Assessment Questions  1. REASON FOR CALL or QUESTION: \"What is your reason for calling today?\" or \"How can I best help you?\" or \"What question do you have that I can help answer?\"        Lab Calling for update on PA for Brush Test , would like a call back at [phone]    Protocols used: Information Only Call - No Triage-ADULT-[place]    "

## 2025-07-24 ENCOUNTER — APPOINTMENT (OUTPATIENT)
Age: 65
End: 2025-07-24
Attending: CHIROPRACTOR
Payer: COMMERCIAL

## 2025-07-24 ENCOUNTER — APPOINTMENT (OUTPATIENT)
Age: 65
End: 2025-07-24
Payer: COMMERCIAL

## 2025-07-24 DIAGNOSIS — M54.2 CERVICALGIA: ICD-10-CM

## 2025-07-24 PROCEDURE — 72170 X-RAY EXAM OF PELVIS: CPT

## 2025-07-24 PROCEDURE — 72082 X-RAY EXAM ENTIRE SPI 2/3 VW: CPT

## 2025-07-24 PROCEDURE — 72050 X-RAY EXAM NECK SPINE 4/5VWS: CPT
